# Patient Record
Sex: MALE | NOT HISPANIC OR LATINO | URBAN - NONMETROPOLITAN AREA
[De-identification: names, ages, dates, MRNs, and addresses within clinical notes are randomized per-mention and may not be internally consistent; named-entity substitution may affect disease eponyms.]

---

## 2023-06-29 ENCOUNTER — OFFICE VISIT (OUTPATIENT)
Dept: PRIMARY CARE | Facility: CLINIC | Age: 43
End: 2023-06-29
Payer: COMMERCIAL

## 2023-06-29 VITALS
HEIGHT: 67 IN | BODY MASS INDEX: 28 KG/M2 | TEMPERATURE: 97.1 F | SYSTOLIC BLOOD PRESSURE: 110 MMHG | WEIGHT: 178.4 LBS | HEART RATE: 67 BPM | OXYGEN SATURATION: 98 % | DIASTOLIC BLOOD PRESSURE: 76 MMHG

## 2023-06-29 DIAGNOSIS — Z13.220 SCREENING, LIPID: ICD-10-CM

## 2023-06-29 DIAGNOSIS — Z12.5 SCREENING FOR MALIGNANT NEOPLASM OF PROSTATE: ICD-10-CM

## 2023-06-29 DIAGNOSIS — F10.10 ALCOHOL ABUSE: ICD-10-CM

## 2023-06-29 DIAGNOSIS — Z11.59 NEED FOR HEPATITIS C SCREENING TEST: ICD-10-CM

## 2023-06-29 DIAGNOSIS — F41.9 ANXIETY: Primary | ICD-10-CM

## 2023-06-29 DIAGNOSIS — E55.9 VITAMIN D DEFICIENCY: ICD-10-CM

## 2023-06-29 DIAGNOSIS — Z11.4 ENCOUNTER FOR SCREENING FOR HIV: ICD-10-CM

## 2023-06-29 DIAGNOSIS — F32.A DEPRESSION, UNSPECIFIED DEPRESSION TYPE: ICD-10-CM

## 2023-06-29 PROCEDURE — 1036F TOBACCO NON-USER: CPT | Performed by: NURSE PRACTITIONER

## 2023-06-29 PROCEDURE — 99204 OFFICE O/P NEW MOD 45 MIN: CPT | Performed by: NURSE PRACTITIONER

## 2023-06-29 ASSESSMENT — PATIENT HEALTH QUESTIONNAIRE - PHQ9
1. LITTLE INTEREST OR PLEASURE IN DOING THINGS: NOT AT ALL
SUM OF ALL RESPONSES TO PHQ9 QUESTIONS 1 AND 2: 0
2. FEELING DOWN, DEPRESSED OR HOPELESS: NOT AT ALL

## 2023-06-29 ASSESSMENT — ANXIETY QUESTIONNAIRES
4. TROUBLE RELAXING: NOT AT ALL
3. WORRYING TOO MUCH ABOUT DIFFERENT THINGS: SEVERAL DAYS
2. NOT BEING ABLE TO STOP OR CONTROL WORRYING: SEVERAL DAYS
GAD7 TOTAL SCORE: 4
1. FEELING NERVOUS, ANXIOUS, OR ON EDGE: SEVERAL DAYS
6. BECOMING EASILY ANNOYED OR IRRITABLE: SEVERAL DAYS
7. FEELING AFRAID AS IF SOMETHING AWFUL MIGHT HAPPEN: NOT AT ALL
5. BEING SO RESTLESS THAT IT IS HARD TO SIT STILL: NOT AT ALL

## 2023-06-29 NOTE — PROGRESS NOTES
"Subjective   Patient ID: Oscar Holbrook Jr. is a 43 y.o. male who presents for New Patient Visit, Anxiety, and Depression.    New patient here to establish.  Patient currently seeing therapist, undergoing IOP following an MAGDY.  Depression and anxiety, will discuss medication after blood work completed  Needs complete blood work  Exercises regularly    Preventive:  CRC screen:  PSA: Ordered  CT cardiac scoring:         Review of Systems   Constitutional:  Negative for activity change, chills, fatigue and unexpected weight change.   HENT:  Negative for congestion, ear pain and sore throat.    Eyes: Negative.    Respiratory:  Negative for cough, shortness of breath and wheezing.    Cardiovascular:  Negative for chest pain, palpitations and leg swelling.   Gastrointestinal: Negative.  Negative for abdominal pain, constipation, diarrhea, nausea and vomiting.   Endocrine: Negative.    Genitourinary: Negative.    Musculoskeletal: Negative.    Skin: Negative.    Allergic/Immunologic: Negative.    Neurological:  Negative for dizziness, speech difficulty, numbness and headaches.   Hematological: Negative.    Psychiatric/Behavioral: Negative.          Anxiety, depression   All other systems reviewed and are negative.      Objective   /76   Pulse 67   Temp 36.2 °C (97.1 °F)   Ht 1.689 m (5' 6.5\")   Wt 80.9 kg (178 lb 6.4 oz)   SpO2 98%   BMI 28.36 kg/m²     Physical Exam  Vitals and nursing note reviewed.   Constitutional:       General: He is not in acute distress.     Appearance: Normal appearance. He is normal weight. He is not ill-appearing.   HENT:      Head: Normocephalic and atraumatic.      Right Ear: Tympanic membrane, ear canal and external ear normal.      Left Ear: Tympanic membrane, ear canal and external ear normal.      Nose: Nose normal.      Mouth/Throat:      Mouth: Mucous membranes are moist.      Pharynx: Oropharynx is clear.   Eyes:      Extraocular Movements: Extraocular movements intact.      " Conjunctiva/sclera: Conjunctivae normal.      Pupils: Pupils are equal, round, and reactive to light.   Cardiovascular:      Rate and Rhythm: Normal rate and regular rhythm.      Pulses: Normal pulses.      Heart sounds: Normal heart sounds. No murmur heard.  Pulmonary:      Effort: Pulmonary effort is normal. No respiratory distress.      Breath sounds: Normal breath sounds. No wheezing.   Abdominal:      General: Bowel sounds are normal. There is no distension.      Palpations: Abdomen is soft.      Tenderness: There is no abdominal tenderness.   Musculoskeletal:         General: No tenderness. Normal range of motion.      Cervical back: Normal range of motion and neck supple.      Right lower leg: No edema.      Left lower leg: No edema.   Skin:     General: Skin is warm and dry.      Capillary Refill: Capillary refill takes less than 2 seconds.   Neurological:      General: No focal deficit present.      Mental Status: He is alert and oriented to person, place, and time.   Psychiatric:         Mood and Affect: Mood normal.         Behavior: Behavior normal.         Thought Content: Thought content normal.         Judgment: Judgment normal.         Assessment/Plan     # Depression, anxiety  -Continue to follow with counselor  -Complete labs, will discuss medication pending results  # Alcohol abuse  -Continue to follow with IOP program    Follow-up in 6 months for AWV and as needed

## 2023-06-30 ENCOUNTER — LAB (OUTPATIENT)
Dept: LAB | Facility: LAB | Age: 43
End: 2023-06-30
Payer: COMMERCIAL

## 2023-06-30 DIAGNOSIS — F41.9 ANXIETY: ICD-10-CM

## 2023-06-30 DIAGNOSIS — Z12.5 SCREENING FOR MALIGNANT NEOPLASM OF PROSTATE: ICD-10-CM

## 2023-06-30 DIAGNOSIS — Z11.59 NEED FOR HEPATITIS C SCREENING TEST: ICD-10-CM

## 2023-06-30 DIAGNOSIS — E55.9 VITAMIN D DEFICIENCY: ICD-10-CM

## 2023-06-30 DIAGNOSIS — Z13.220 SCREENING, LIPID: ICD-10-CM

## 2023-06-30 DIAGNOSIS — Z11.4 ENCOUNTER FOR SCREENING FOR HIV: ICD-10-CM

## 2023-06-30 LAB
ALANINE AMINOTRANSFERASE (SGPT) (U/L) IN SER/PLAS: 25 U/L (ref 10–52)
ALBUMIN (G/DL) IN SER/PLAS: 4.9 G/DL (ref 3.4–5)
ALKALINE PHOSPHATASE (U/L) IN SER/PLAS: 65 U/L (ref 33–120)
ANION GAP IN SER/PLAS: 18 MMOL/L (ref 10–20)
ASPARTATE AMINOTRANSFERASE (SGOT) (U/L) IN SER/PLAS: 27 U/L (ref 9–39)
BASOPHILS (10*3/UL) IN BLOOD BY AUTOMATED COUNT: 0.09 X10E9/L (ref 0–0.1)
BASOPHILS/100 LEUKOCYTES IN BLOOD BY AUTOMATED COUNT: 1 % (ref 0–2)
BILIRUBIN TOTAL (MG/DL) IN SER/PLAS: 1 MG/DL (ref 0–1.2)
CALCIUM (MG/DL) IN SER/PLAS: 9.4 MG/DL (ref 8.6–10.3)
CARBON DIOXIDE, TOTAL (MMOL/L) IN SER/PLAS: 26 MMOL/L (ref 21–32)
CHLORIDE (MMOL/L) IN SER/PLAS: 98 MMOL/L (ref 98–107)
CHOLESTEROL (MG/DL) IN SER/PLAS: 210 MG/DL (ref 0–199)
CHOLESTEROL IN HDL (MG/DL) IN SER/PLAS: 56.7 MG/DL
CHOLESTEROL/HDL RATIO: 3.7
CREATININE (MG/DL) IN SER/PLAS: 1.13 MG/DL (ref 0.5–1.3)
EOSINOPHILS (10*3/UL) IN BLOOD BY AUTOMATED COUNT: 0.42 X10E9/L (ref 0–0.7)
EOSINOPHILS/100 LEUKOCYTES IN BLOOD BY AUTOMATED COUNT: 4.8 % (ref 0–6)
ERYTHROCYTE DISTRIBUTION WIDTH (RATIO) BY AUTOMATED COUNT: 12.3 % (ref 11.5–14.5)
ERYTHROCYTE MEAN CORPUSCULAR HEMOGLOBIN CONCENTRATION (G/DL) BY AUTOMATED: 34.1 G/DL (ref 32–36)
ERYTHROCYTE MEAN CORPUSCULAR VOLUME (FL) BY AUTOMATED COUNT: 92 FL (ref 80–100)
ERYTHROCYTES (10*6/UL) IN BLOOD BY AUTOMATED COUNT: 5.01 X10E12/L (ref 4.5–5.9)
GFR MALE: 84 ML/MIN/1.73M2
GLUCOSE (MG/DL) IN SER/PLAS: 132 MG/DL (ref 74–99)
HEMATOCRIT (%) IN BLOOD BY AUTOMATED COUNT: 46 % (ref 41–52)
HEMOGLOBIN (G/DL) IN BLOOD: 15.7 G/DL (ref 13.5–17.5)
IMMATURE GRANULOCYTES/100 LEUKOCYTES IN BLOOD BY AUTOMATED COUNT: 0.6 % (ref 0–0.9)
LDL: 126 MG/DL (ref 0–99)
LEUKOCYTES (10*3/UL) IN BLOOD BY AUTOMATED COUNT: 8.7 X10E9/L (ref 4.4–11.3)
LYMPHOCYTES (10*3/UL) IN BLOOD BY AUTOMATED COUNT: 1.3 X10E9/L (ref 1.2–4.8)
LYMPHOCYTES/100 LEUKOCYTES IN BLOOD BY AUTOMATED COUNT: 14.9 % (ref 13–44)
MONOCYTES (10*3/UL) IN BLOOD BY AUTOMATED COUNT: 0.59 X10E9/L (ref 0.1–1)
MONOCYTES/100 LEUKOCYTES IN BLOOD BY AUTOMATED COUNT: 6.8 % (ref 2–10)
NEUTROPHILS (10*3/UL) IN BLOOD BY AUTOMATED COUNT: 6.25 X10E9/L (ref 1.2–7.7)
NEUTROPHILS/100 LEUKOCYTES IN BLOOD BY AUTOMATED COUNT: 71.9 % (ref 40–80)
PLATELETS (10*3/UL) IN BLOOD AUTOMATED COUNT: 308 X10E9/L (ref 150–450)
POTASSIUM (MMOL/L) IN SER/PLAS: 4.1 MMOL/L (ref 3.5–5.3)
PROTEIN TOTAL: 7.6 G/DL (ref 6.4–8.2)
SODIUM (MMOL/L) IN SER/PLAS: 138 MMOL/L (ref 136–145)
THYROTROPIN (MIU/L) IN SER/PLAS BY DETECTION LIMIT <= 0.05 MIU/L: 1.69 MIU/L (ref 0.44–3.98)
TRIGLYCERIDE (MG/DL) IN SER/PLAS: 136 MG/DL (ref 0–149)
UREA NITROGEN (MG/DL) IN SER/PLAS: 11 MG/DL (ref 6–23)
VLDL: 27 MG/DL (ref 0–40)

## 2023-06-30 PROCEDURE — 87389 HIV-1 AG W/HIV-1&-2 AB AG IA: CPT

## 2023-06-30 PROCEDURE — 85025 COMPLETE CBC W/AUTO DIFF WBC: CPT

## 2023-06-30 PROCEDURE — 36415 COLL VENOUS BLD VENIPUNCTURE: CPT

## 2023-06-30 PROCEDURE — 80053 COMPREHEN METABOLIC PANEL: CPT

## 2023-06-30 PROCEDURE — 80061 LIPID PANEL: CPT

## 2023-06-30 PROCEDURE — 86803 HEPATITIS C AB TEST: CPT

## 2023-06-30 PROCEDURE — 82652 VIT D 1 25-DIHYDROXY: CPT

## 2023-06-30 PROCEDURE — 84443 ASSAY THYROID STIM HORMONE: CPT

## 2023-06-30 PROCEDURE — 84153 ASSAY OF PSA TOTAL: CPT

## 2023-07-01 LAB
HEPATITIS C VIRUS AB PRESENCE IN SERUM: NONREACTIVE
HIV 1/ 2 AG/AB SCREEN: NONREACTIVE
PROSTATE SPECIFIC ANTIGEN,SCREEN: 0.17 NG/ML (ref 0–4)

## 2023-07-02 PROBLEM — F41.9 ANXIETY: Status: ACTIVE | Noted: 2023-07-02

## 2023-07-02 PROBLEM — F32.A DEPRESSION: Status: ACTIVE | Noted: 2023-07-02

## 2023-07-02 PROBLEM — F10.10 ALCOHOL ABUSE: Status: ACTIVE | Noted: 2023-07-02

## 2023-07-02 ASSESSMENT — ENCOUNTER SYMPTOMS
HEADACHES: 0
ENDOCRINE NEGATIVE: 1
MUSCULOSKELETAL NEGATIVE: 1
GASTROINTESTINAL NEGATIVE: 1
SORE THROAT: 0
NUMBNESS: 0
ALLERGIC/IMMUNOLOGIC NEGATIVE: 1
SHORTNESS OF BREATH: 0
DIZZINESS: 0
DIARRHEA: 0
UNEXPECTED WEIGHT CHANGE: 0
CONSTIPATION: 0
VOMITING: 0
COUGH: 0
EYES NEGATIVE: 1
ACTIVITY CHANGE: 0
HEMATOLOGIC/LYMPHATIC NEGATIVE: 1
PALPITATIONS: 0
CHILLS: 0
FATIGUE: 0
SPEECH DIFFICULTY: 0
ABDOMINAL PAIN: 0
WHEEZING: 0
NAUSEA: 0
PSYCHIATRIC NEGATIVE: 1

## 2023-07-03 LAB — VITAMIN D 1,25-DIHYDROXY: 53.8 PG/ML (ref 19.9–79.3)

## 2023-12-18 ENCOUNTER — OFFICE VISIT (OUTPATIENT)
Dept: PRIMARY CARE | Facility: CLINIC | Age: 43
End: 2023-12-18
Payer: COMMERCIAL

## 2023-12-18 VITALS
WEIGHT: 174.2 LBS | TEMPERATURE: 97.5 F | BODY MASS INDEX: 27.7 KG/M2 | DIASTOLIC BLOOD PRESSURE: 90 MMHG | HEART RATE: 58 BPM | SYSTOLIC BLOOD PRESSURE: 112 MMHG | OXYGEN SATURATION: 98 %

## 2023-12-18 DIAGNOSIS — K29.70 GASTRITIS WITHOUT BLEEDING, UNSPECIFIED CHRONICITY, UNSPECIFIED GASTRITIS TYPE: Primary | ICD-10-CM

## 2023-12-18 DIAGNOSIS — H10.501 BLEPHAROCONJUNCTIVITIS OF RIGHT EYE, UNSPECIFIED BLEPHAROCONJUNCTIVITIS TYPE: ICD-10-CM

## 2023-12-18 PROCEDURE — 99213 OFFICE O/P EST LOW 20 MIN: CPT | Performed by: INTERNAL MEDICINE

## 2023-12-18 PROCEDURE — 1036F TOBACCO NON-USER: CPT | Performed by: INTERNAL MEDICINE

## 2023-12-18 RX ORDER — PANTOPRAZOLE SODIUM 40 MG/1
40 TABLET, DELAYED RELEASE ORAL DAILY
Qty: 30 TABLET | Refills: 0 | Status: SHIPPED | OUTPATIENT
Start: 2023-12-18 | End: 2024-01-17

## 2023-12-18 RX ORDER — NEOMYCIN SULFATE, POLYMYXIN B SULFATE AND DEXAMETHASONE 3.5; 10000; 1 MG/ML; [USP'U]/ML; MG/ML
1 SUSPENSION/ DROPS OPHTHALMIC 4 TIMES DAILY
Qty: 2 ML | Refills: 0 | Status: SHIPPED | OUTPATIENT
Start: 2023-12-18 | End: 2023-12-28

## 2023-12-18 ASSESSMENT — ENCOUNTER SYMPTOMS
FATIGUE: 0
DIARRHEA: 0
UNEXPECTED WEIGHT CHANGE: 0
DIFFICULTY URINATING: 0
SORE THROAT: 0
WHEEZING: 0
ARTHRALGIAS: 0
DIZZINESS: 0
BRUISES/BLEEDS EASILY: 0
COUGH: 0
HEADACHES: 0
FEVER: 0
BLOOD IN STOOL: 0
SINUS PAIN: 0
ABDOMINAL PAIN: 1
EYE REDNESS: 1
PALPITATIONS: 0

## 2023-12-18 NOTE — PROGRESS NOTES
"Subjective   Patient ID: Oscar Holbrook Jr. is a 43 y.o. male who presents for Nausea (Nausea this am, needs work note) and Conjunctivitis (Child has pink eye, started using drops in right eye, red, sensitive to light).    Right eye redness patient discontinued contact lenses  - Conjunctivitis continue with Maxitrol for 2 to 3 days eyedrops as recommended  - Epigastric pain and gastritis counseled about alcohol abstinence and no NSAIDs  Started on Protonix daily for 30 days then to stop with any recurrence need to obtain upper endoscopy follow-up with Susan as a scheduled  Given note to his work 2 days off follow-up closely as needed         Conjunctivitis   Associated symptoms include abdominal pain and eye redness. Pertinent negatives include no fever, no diarrhea, no congestion, no ear discharge, no ear pain, no headaches, no mouth sores, no sore throat, no cough, no wheezing and no rash.          Review of Systems   Constitutional:  Negative for fatigue, fever and unexpected weight change.   HENT:  Negative for congestion, ear discharge, ear pain, mouth sores, sinus pain and sore throat.    Eyes:  Positive for redness. Negative for visual disturbance.   Respiratory:  Negative for cough and wheezing.    Cardiovascular:  Negative for chest pain, palpitations and leg swelling.   Gastrointestinal:  Positive for abdominal pain. Negative for blood in stool and diarrhea.   Genitourinary:  Negative for difficulty urinating.   Musculoskeletal:  Negative for arthralgias.   Skin:  Negative for rash.   Neurological:  Negative for dizziness and headaches.   Hematological:  Does not bruise/bleed easily.   Psychiatric/Behavioral:  Negative for behavioral problems.    All other systems reviewed and are negative.      Objective   No results found for: \"HGBA1C\"   /90   Pulse 58   Temp 36.4 °C (97.5 °F)   Wt 79 kg (174 lb 3.2 oz)   SpO2 98%   BMI 27.70 kg/m²     Physical Exam  Vitals and nursing note reviewed. "   Constitutional:       Appearance: Normal appearance.   HENT:      Head: Normocephalic.      Nose: Nose normal.   Eyes:      General: No scleral icterus.        Right eye: No discharge.         Left eye: No discharge.      Pupils: Pupils are equal, round, and reactive to light.      Comments: Right eye conjunctivitis   Cardiovascular:      Rate and Rhythm: Regular rhythm.   Pulmonary:      Effort: Pulmonary effort is normal.      Breath sounds: Normal breath sounds.   Abdominal:      General: Abdomen is flat.      Palpations: Abdomen is soft.      Tenderness: There is abdominal tenderness (Epigastric tenderness).   Musculoskeletal:      Cervical back: Neck supple.   Skin:     General: Skin is warm.   Neurological:      General: No focal deficit present.      Mental Status: He is oriented to person, place, and time.   Psychiatric:         Mood and Affect: Mood normal.         Assessment/Plan   Oscar was seen today for nausea and conjunctivitis.  Diagnoses and all orders for this visit:  Gastritis without bleeding, unspecified chronicity, unspecified gastritis type (Primary)  -     pantoprazole (ProtoNix) 40 mg EC tablet; Take 1 tablet (40 mg) by mouth once daily. Do not crush, chew, or split.  Blepharoconjunctivitis of right eye, unspecified blepharoconjunctivitis type  -     neomycin-polymyxin-dexAMETHasone (Maxitrol) 3.5mg/mL-10,000 unit/mL-0.1 % ophthalmic suspension; Administer 1 drop into the right eye 4 times a day for 10 days.   Right eye redness patient discontinued contact lenses  - Conjunctivitis continue with Maxitrol for 2 to 3 days eyedrops as recommended  - Epigastric pain and gastritis counseled about alcohol abstinence and no NSAIDs  Started on Protonix daily for 30 days then to stop with any recurrence need to obtain upper endoscopy follow-up with Susan as a scheduled  Given note to his work 2 days off follow-up closely as needed

## 2024-09-24 ENCOUNTER — OFFICE VISIT (OUTPATIENT)
Dept: URGENT CARE | Age: 44
End: 2024-09-24
Payer: COMMERCIAL

## 2024-09-24 VITALS
HEIGHT: 67 IN | WEIGHT: 173 LBS | HEART RATE: 58 BPM | RESPIRATION RATE: 12 BRPM | BODY MASS INDEX: 27.15 KG/M2 | SYSTOLIC BLOOD PRESSURE: 143 MMHG | DIASTOLIC BLOOD PRESSURE: 92 MMHG | OXYGEN SATURATION: 98 % | TEMPERATURE: 97.7 F

## 2024-09-24 DIAGNOSIS — J98.8 CONGESTION OF UPPER AIRWAY: ICD-10-CM

## 2024-09-24 DIAGNOSIS — J06.9 ACUTE UPPER RESPIRATORY INFECTION: Primary | ICD-10-CM

## 2024-09-24 LAB
POC RAPID INFLUENZA A: NEGATIVE
POC RAPID INFLUENZA B: NEGATIVE
POC SARS-COV-2 AG BINAX: NORMAL

## 2024-09-24 ASSESSMENT — ENCOUNTER SYMPTOMS
CHILLS: 0
ABDOMINAL PAIN: 0
FEVER: 1
VOMITING: 0
CHEST TIGHTNESS: 0
DIARRHEA: 0
FATIGUE: 0
FLANK PAIN: 0
SINUS PRESSURE: 0
SHORTNESS OF BREATH: 0
STRIDOR: 0
EYE PAIN: 0
DIZZINESS: 0
SORE THROAT: 0
COUGH: 0
WHEEZING: 0

## 2024-09-24 ASSESSMENT — VISUAL ACUITY: OU: 1

## 2024-09-24 NOTE — LETTER
September 24, 2024     Patient: Oscar Holbrook .   YOB: 1980   Date of Visit: 9/24/2024       To Whom It May Concern:    Oscar Holbrook was seen in my clinic on 9/24/2024 at 11:45 am. Please excuse Oscar for his absence from work on this day to make the appointment.  He can return to work Thursday 9/26/24.    If you have any questions or concerns, please don't hesitate to call.         Sincerely,         [facility]        CC: No Recipients

## 2024-09-24 NOTE — PATIENT INSTRUCTIONS
Discharge instructions    Please follow up with your Primary Care Physician within the next 5-7 days.    It is important to take prescriptions as prescribed and complete all antibiotics.     If your symptoms worsen you are instructed to immediately go to the emergency room for reevaluation and further assessment.    If you develop any chest pain, SOB, or difficulty breathing you are instructed to go to the emergency room for reevaluation.    All discharge instructions will be provided and explained to the patient at discharge.    If you have any questions regarding your treatment plan please call the [facility] urgent care clinic.

## 2024-09-24 NOTE — PROGRESS NOTES
Subjective   Patient ID: Oscar Holbrook Jr. is a 44 y.o. male. They present today with a chief complaint of Nasal Congestion (Patient c/o nasal congestion, body aches, fever, started 3 days ago.).    History of Present Illness  Patient is a 44-year-old male presenting to the clinic with complaints of bodyaches, fever, congestion for the last 2 days.  Patient states he missed work today and would like to have today and tomorrow for work.  Patient denies any other acute ROS at this time.  Patient denies chest pain or shortness of breath in the clinic.  Patient denies cough.  Patient states fever of 100 yesterday.  No fever today.  Patient states his symptoms have improved since start of symptoms.      History provided by:  Patient      Past Medical History  Allergies as of 09/24/2024    (No Known Allergies)       (Not in a hospital admission)       Past Medical History:   Diagnosis Date    Other conditions influencing health status     No significant past medical history       Past Surgical History:   Procedure Laterality Date    HERNIA REPAIR  10/17/2014    Hernia Repair    WISDOM TOOTH EXTRACTION          reports that he quit smoking about 5 years ago. His smoking use included cigarettes. He has never used smokeless tobacco. He reports current alcohol use of about 2.0 standard drinks of alcohol per week. He reports that he does not use drugs.    Review of Systems  Review of Systems   Constitutional:  Positive for fever. Negative for chills and fatigue.   HENT:  Positive for congestion. Negative for ear discharge, ear pain, postnasal drip, sinus pressure and sore throat.    Eyes:  Negative for pain.   Respiratory:  Negative for cough, chest tightness, shortness of breath, wheezing and stridor.    Cardiovascular:  Negative for chest pain.   Gastrointestinal:  Negative for abdominal pain, diarrhea and vomiting.   Genitourinary:  Negative for flank pain.   Neurological:  Negative for dizziness.                        "           Objective    Vitals:    09/24/24 1156   BP: (!) 143/92   BP Location: Left arm   Patient Position: Sitting   BP Cuff Size: Adult   Pulse: 58   Resp: 12   Temp: 36.5 °C (97.7 °F)   TempSrc: Oral   SpO2: 98%   Weight: 78.5 kg (173 lb)   Height: 1.702 m (5' 7\")     No LMP for male patient.    Physical Exam  Constitutional:       General: He is awake. He is not in acute distress.     Appearance: Normal appearance. He is well-developed and normal weight. He is not ill-appearing or toxic-appearing.   HENT:      Head: Normocephalic and atraumatic.      Right Ear: Tympanic membrane, ear canal and external ear normal.      Left Ear: Tympanic membrane, ear canal and external ear normal.      Nose: Congestion present.      Mouth/Throat:      Mouth: Mucous membranes are moist.      Pharynx: No oropharyngeal exudate or posterior oropharyngeal erythema.   Eyes:      General: Vision grossly intact.      Conjunctiva/sclera: Conjunctivae normal.   Cardiovascular:      Rate and Rhythm: Normal rate and regular rhythm.      Pulses: Normal pulses.      Heart sounds: Normal heart sounds, S1 normal and S2 normal. No murmur heard.     No friction rub. No gallop.   Pulmonary:      Effort: Pulmonary effort is normal. No respiratory distress.      Breath sounds: Normal breath sounds. No stridor. No wheezing, rhonchi or rales.   Skin:     General: Skin is warm.   Neurological:      General: No focal deficit present.      Mental Status: He is alert and oriented to person, place, and time. Mental status is at baseline.      Gait: Gait is intact.   Psychiatric:         Mood and Affect: Mood normal.         Behavior: Behavior normal. Behavior is cooperative.         Procedures    Point of Care Test & Imaging Results from this visit  Results for orders placed or performed in visit on 09/24/24   POCT Covid-19 Rapid Antigen   Result Value Ref Range    POC MIKEL-COV-2 AG  Presumptive negative test for SARS-CoV-2 (no antigen detected)     " Presumptive negative test for SARS-CoV-2 (no antigen detected)   POCT Influenza A/B manually resulted   Result Value Ref Range    POC Rapid Influenza A Negative Negative    POC Rapid Influenza B Negative Negative      No results found.    Diagnostic study results (if any) were reviewed by [facility].    Assessment/Plan   Allergies, medications, history, and pertinent labs/EKGs/Imaging reviewed by Miguel Washington PA-C.     Medical Decision Making  Patient is a 44-year-old male presenting to the clinic with complaints of bodyaches, fever, congestion for the last 2 days.  Patient states he missed work today and would like to have today and tomorrow for work.  Patient denies any other acute ROS at this time.  Patient denies chest pain or shortness of breath in the clinic.  Patient denies cough.  Patient states fever of 100 yesterday.  No fever today.  Patient states his symptoms have improved since start of symptoms.  Patient's vital signs in the clinic are stable.  Patient's physical examination as above.  Patient's COVID and flu in the clinic are negative.  Patient to use over-the-counter medications for symptomatic control at this time.  Please follow-up with your primary doctor in 5 to 7 days for reevaluation.  Please go to the emergency room if you develop any chest pain, shortness of difficulty breathing.    Orders and Diagnoses  Diagnoses and all orders for this visit:  Acute upper respiratory infection  Congestion of upper airway  -     POCT Covid-19 Rapid Antigen  -     POCT Influenza A/B manually resulted      Medical Admin Record      Patient disposition: Home    Electronically signed by [facility]  12:09 PM

## 2025-02-10 ENCOUNTER — TELEPHONE (OUTPATIENT)
Dept: PRIMARY CARE | Facility: CLINIC | Age: 45
End: 2025-02-10

## 2025-02-10 ENCOUNTER — APPOINTMENT (OUTPATIENT)
Dept: PRIMARY CARE | Facility: CLINIC | Age: 45
End: 2025-02-10
Payer: COMMERCIAL

## 2025-02-10 NOTE — TELEPHONE ENCOUNTER
Patient was put on Dr. Marie Sanz's schedule for a new patient appt. I tried to contact patient by phone to discuss that Marie was no longer taking on more of katherine's patients and it went straight to a voicemail that is full. I then sent him a my chart message. Patient never called back and did show up for the appt. I tried to explain that central scheduling is not aware of our scheduling process and offered to check with Dr. Liliana Sanz if he would like or offered to give him names and numbers of other providers. He did not want me to check with Liliana and was not happy when he left.

## 2025-02-11 NOTE — PROGRESS NOTES
Subjective   Patient ID: Oscar Holbrook Jr. is a 45 y.o. male who presents for No chief complaint on file..    HPI   Pt here to establish; transfer from [facility]    Past Medical History:   Diagnosis Date    Alcohol abuse 07/02/2023    Anxiety 07/02/2023    Depression 07/02/2023    Other conditions influencing health status     No significant past medical history     Current Outpatient Medications   Medication Sig Dispense Refill    pantoprazole (ProtoNix) 40 mg EC tablet Take 1 tablet (40 mg) by mouth once daily. Do not crush, chew, or split. 30 tablet 0     No current facility-administered medications for this visit.       Review of Systems see hpi    Objective   There were no vitals taken for this visit.    Physical Exam    Assessment/Plan   Assessment & Plan

## 2025-02-12 ENCOUNTER — APPOINTMENT (OUTPATIENT)
Dept: PRIMARY CARE | Facility: CLINIC | Age: 45
End: 2025-02-12
Payer: COMMERCIAL

## 2025-02-26 ENCOUNTER — APPOINTMENT (OUTPATIENT)
Dept: PRIMARY CARE | Facility: CLINIC | Age: 45
End: 2025-02-26
Payer: COMMERCIAL

## 2025-02-26 VITALS
SYSTOLIC BLOOD PRESSURE: 122 MMHG | BODY MASS INDEX: 27.89 KG/M2 | TEMPERATURE: 97.6 F | HEART RATE: 79 BPM | OXYGEN SATURATION: 99 % | DIASTOLIC BLOOD PRESSURE: 78 MMHG | WEIGHT: 177.7 LBS | HEIGHT: 67 IN

## 2025-02-26 DIAGNOSIS — F41.9 ANXIETY: ICD-10-CM

## 2025-02-26 DIAGNOSIS — Z00.00 WELLNESS EXAMINATION: Primary | ICD-10-CM

## 2025-02-26 DIAGNOSIS — F32.9 REACTIVE DEPRESSION: ICD-10-CM

## 2025-02-26 PROCEDURE — 1036F TOBACCO NON-USER: CPT | Performed by: FAMILY MEDICINE

## 2025-02-26 PROCEDURE — 3008F BODY MASS INDEX DOCD: CPT | Performed by: FAMILY MEDICINE

## 2025-02-26 PROCEDURE — 99204 OFFICE O/P NEW MOD 45 MIN: CPT | Performed by: FAMILY MEDICINE

## 2025-02-26 RX ORDER — BUSPIRONE HYDROCHLORIDE 5 MG/1
5 TABLET ORAL 2 TIMES DAILY
Qty: 60 TABLET | Refills: 0 | Status: SHIPPED | OUTPATIENT
Start: 2025-02-26 | End: 2025-03-28

## 2025-02-26 ASSESSMENT — ENCOUNTER SYMPTOMS
NUMBNESS: 0
WEAKNESS: 0
SLEEP DISTURBANCE: 0
JOINT SWELLING: 0
FATIGUE: 1
VOMITING: 0
HEADACHES: 0
EYE DISCHARGE: 0
SINUS PRESSURE: 0
COUGH: 0
DIZZINESS: 0
FEVER: 0
NERVOUS/ANXIOUS: 1
ARTHRALGIAS: 0
DYSURIA: 0
BLOOD IN STOOL: 0
RHINORRHEA: 0
UNEXPECTED WEIGHT CHANGE: 0
PALPITATIONS: 0
DYSPHORIC MOOD: 0
CONSTIPATION: 0
EYE ITCHING: 0
ABDOMINAL PAIN: 0
SORE THROAT: 0
LIGHT-HEADEDNESS: 0
DIARRHEA: 0
MYALGIAS: 0
APPETITE CHANGE: 0
ACTIVITY CHANGE: 0
SHORTNESS OF BREATH: 0
WHEEZING: 0
HEMATURIA: 0
FLANK PAIN: 0
NAUSEA: 0

## 2025-02-26 ASSESSMENT — COLUMBIA-SUICIDE SEVERITY RATING SCALE - C-SSRS
6. HAVE YOU EVER DONE ANYTHING, STARTED TO DO ANYTHING, OR PREPARED TO DO ANYTHING TO END YOUR LIFE?: NO
1. IN THE PAST MONTH, HAVE YOU WISHED YOU WERE DEAD OR WISHED YOU COULD GO TO SLEEP AND NOT WAKE UP?: NO
2. HAVE YOU ACTUALLY HAD ANY THOUGHTS OF KILLING YOURSELF?: NO

## 2025-02-26 ASSESSMENT — PATIENT HEALTH QUESTIONNAIRE - PHQ9
SUM OF ALL RESPONSES TO PHQ9 QUESTIONS 1 AND 2: 0
1. LITTLE INTEREST OR PLEASURE IN DOING THINGS: NOT AT ALL
2. FEELING DOWN, DEPRESSED OR HOPELESS: NOT AT ALL

## 2025-02-26 NOTE — PROGRESS NOTES
"Subjective   Patient ID: Oscar Holbrook Jr. is a 45 y.o. male who presents for New Patient Visit (EST PCP. KAMERON ANXIETY MEDICATION AND  MENTION GETTING TESTOSTERONE CHECK BEING OVER 40).    HPI NEEDS BIOPHYSICAL EXAM   HAS SOCIAL ANXIETY DISORDER AND ISSUES AT WORK WITH TALKING IN GROUPS   SEES COUNSELOR (MENTIONED BUSPAR)   RUNS TO KEEP WEIGHT AND TO HELP WITH JAQUAN   FEELS TACHYCARDIA   PRESSURE  NO FM HX EARLY CAD OR DIABETES   10 POUNDS HEAVIER TWO YEARS AGO  AND LABS SHOWED HIGHER SUGAR AND HE WAS NOT FASTING     Review of Systems   Constitutional:  Positive for fatigue. Negative for activity change, appetite change, fever and unexpected weight change.   HENT:  Negative for congestion, ear pain, postnasal drip, rhinorrhea, sinus pressure and sore throat.    Eyes:  Negative for discharge, itching and visual disturbance.   Respiratory:  Negative for cough, shortness of breath and wheezing.    Cardiovascular:  Negative for chest pain, palpitations and leg swelling.   Gastrointestinal:  Negative for abdominal pain, blood in stool, constipation, diarrhea, nausea and vomiting.   Endocrine: Negative for cold intolerance, heat intolerance and polyuria.   Genitourinary:  Negative for dysuria, flank pain and hematuria.   Musculoskeletal:  Negative for arthralgias, gait problem, joint swelling and myalgias.   Skin:  Negative for rash.   Allergic/Immunologic: Negative for environmental allergies and food allergies.   Neurological:  Negative for dizziness, syncope, weakness, light-headedness, numbness and headaches.   Psychiatric/Behavioral:  Negative for dysphoric mood and sleep disturbance. The patient is nervous/anxious.        Objective   /78   Pulse 79   Temp 36.4 °C (97.6 °F)   Ht 1.702 m (5' 7\")   Wt 80.6 kg (177 lb 11.2 oz)   SpO2 99%   BMI 27.83 kg/m²     Physical Exam  Vitals and nursing note reviewed.   Constitutional:       Appearance: Normal appearance.   HENT:      Head: Normocephalic. " "  Cardiovascular:      Rate and Rhythm: Normal rate and regular rhythm.      Pulses: Normal pulses.      Heart sounds: Normal heart sounds. No murmur heard.     No friction rub. No gallop.   Pulmonary:      Effort: Pulmonary effort is normal. No respiratory distress.      Breath sounds: Normal breath sounds. No wheezing.   Abdominal:      General: There is no distension.      Palpations: Abdomen is soft.      Tenderness: There is no abdominal tenderness.   Musculoskeletal:         General: No deformity. Normal range of motion.   Skin:     General: Skin is warm and dry.      Capillary Refill: Capillary refill takes less than 2 seconds.   Neurological:      General: No focal deficit present.      Mental Status: He is alert and oriented to person, place, and time.   Psychiatric:         Mood and Affect: Mood normal.      Comments: SEEMS ANXIOUS TODAY   POOR EYE CONTACT  ASKED IF HE WOULD FILL OUT JAQUAN AND PHQ FORMS AND HE AGREED BUT \"COULD HE MAIL THEN BACK\" HE FEELS PRESSURED. HAS DUTIES AT WORK   BUT THIS MAKES HIM ANXIOUS AND HE KNOWS THAT HE NEEDS HELP   AGREES TO START TO BUSPAR  THIS IS SENT AND WE WILL FOLLOW          Assessment/Plan   Problem List Items Addressed This Visit             ICD-10-CM    Anxiety F41.9    Relevant Medications    busPIRone (Buspar) 5 mg tablet    Other Relevant Orders    Testosterone    Depression F32.A    Relevant Orders    Comprehensive Metabolic Panel    Testosterone    Wellness examination - Primary Z00.00    Relevant Orders    Comprehensive Metabolic Panel    CBC and Auto Differential    Lipid Panel    PSA          "

## 2025-02-28 LAB
ALBUMIN SERPL-MCNC: 4.9 G/DL (ref 3.6–5.1)
ALP SERPL-CCNC: 52 U/L (ref 36–130)
ALT SERPL-CCNC: 20 U/L (ref 9–46)
ANION GAP SERPL CALCULATED.4IONS-SCNC: 9 MMOL/L (CALC) (ref 7–17)
AST SERPL-CCNC: 22 U/L (ref 10–40)
BASOPHILS # BLD AUTO: 43 CELLS/UL (ref 0–200)
BASOPHILS NFR BLD AUTO: 0.7 %
BILIRUB SERPL-MCNC: 1.1 MG/DL (ref 0.2–1.2)
BUN SERPL-MCNC: 14 MG/DL (ref 7–25)
CALCIUM SERPL-MCNC: 9.1 MG/DL (ref 8.6–10.3)
CHLORIDE SERPL-SCNC: 100 MMOL/L (ref 98–110)
CHOLEST SERPL-MCNC: 229 MG/DL
CHOLEST/HDLC SERPL: 2.9 (CALC)
CO2 SERPL-SCNC: 28 MMOL/L (ref 20–32)
CREAT SERPL-MCNC: 0.79 MG/DL (ref 0.6–1.29)
EGFRCR SERPLBLD CKD-EPI 2021: 113 ML/MIN/1.73M2
EOSINOPHIL # BLD AUTO: 128 CELLS/UL (ref 15–500)
EOSINOPHIL NFR BLD AUTO: 2.1 %
ERYTHROCYTE [DISTWIDTH] IN BLOOD BY AUTOMATED COUNT: 12.5 % (ref 11–15)
GLUCOSE SERPL-MCNC: 108 MG/DL (ref 65–99)
HCT VFR BLD AUTO: 45.3 % (ref 38.5–50)
HDLC SERPL-MCNC: 80 MG/DL
HGB BLD-MCNC: 14.9 G/DL (ref 13.2–17.1)
LDLC SERPL CALC-MCNC: 123 MG/DL (CALC)
LYMPHOCYTES # BLD AUTO: 1312 CELLS/UL (ref 850–3900)
LYMPHOCYTES NFR BLD AUTO: 21.5 %
MCH RBC QN AUTO: 31 PG (ref 27–33)
MCHC RBC AUTO-ENTMCNC: 32.9 G/DL (ref 32–36)
MCV RBC AUTO: 94.2 FL (ref 80–100)
MONOCYTES # BLD AUTO: 708 CELLS/UL (ref 200–950)
MONOCYTES NFR BLD AUTO: 11.6 %
NEUTROPHILS # BLD AUTO: 3910 CELLS/UL (ref 1500–7800)
NEUTROPHILS NFR BLD AUTO: 64.1 %
NONHDLC SERPL-MCNC: 149 MG/DL (CALC)
PLATELET # BLD AUTO: 273 THOUSAND/UL (ref 140–400)
PMV BLD REES-ECKER: 9.8 FL (ref 7.5–12.5)
POTASSIUM SERPL-SCNC: 4.2 MMOL/L (ref 3.5–5.3)
PROT SERPL-MCNC: 7.4 G/DL (ref 6.1–8.1)
PSA SERPL-MCNC: 0.24 NG/ML
RBC # BLD AUTO: 4.81 MILLION/UL (ref 4.2–5.8)
SODIUM SERPL-SCNC: 137 MMOL/L (ref 135–146)
TESTOST SERPL-MCNC: 492 NG/DL (ref 250–827)
TRIGL SERPL-MCNC: 148 MG/DL
WBC # BLD AUTO: 6.1 THOUSAND/UL (ref 3.8–10.8)

## 2025-03-07 ENCOUNTER — APPOINTMENT (OUTPATIENT)
Dept: PRIMARY CARE | Facility: CLINIC | Age: 45
End: 2025-03-07
Payer: COMMERCIAL

## 2025-03-18 ENCOUNTER — TELEPHONE (OUTPATIENT)
Dept: PRIMARY CARE | Facility: CLINIC | Age: 45
End: 2025-03-18
Payer: COMMERCIAL

## 2025-03-18 DIAGNOSIS — F41.9 ANXIETY: Primary | ICD-10-CM

## 2025-03-18 RX ORDER — ESCITALOPRAM OXALATE 10 MG/1
10 TABLET ORAL DAILY
Qty: 30 TABLET | Refills: 5 | Status: SHIPPED | OUTPATIENT
Start: 2025-03-18 | End: 2025-09-14

## 2025-03-18 NOTE — TELEPHONE ENCOUNTER
Patient called stating he stopped taking the Buspar stating not helping he was still having panic attacks. He had to reschedule his appt till end of April stating his work is currently in an outage.  Wants to know if something else could be called in, in the meantime? I explained he would have to be evaled but I would let you know. He was wanting to try Lexapro

## 2025-03-24 ENCOUNTER — APPOINTMENT (OUTPATIENT)
Dept: PRIMARY CARE | Facility: CLINIC | Age: 45
End: 2025-03-24
Payer: COMMERCIAL

## 2025-04-28 ENCOUNTER — APPOINTMENT (OUTPATIENT)
Dept: PRIMARY CARE | Facility: CLINIC | Age: 45
End: 2025-04-28
Payer: COMMERCIAL